# Patient Record
Sex: FEMALE | HISPANIC OR LATINO | Employment: PART TIME | URBAN - METROPOLITAN AREA
[De-identification: names, ages, dates, MRNs, and addresses within clinical notes are randomized per-mention and may not be internally consistent; named-entity substitution may affect disease eponyms.]

---
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
## 2023-10-09 ENCOUNTER — OFFICE VISIT (OUTPATIENT)
Dept: URGENT CARE | Facility: PHYSICIAN GROUP | Age: 18
End: 2023-10-09

## 2023-10-09 ENCOUNTER — HOSPITAL ENCOUNTER (OUTPATIENT)
Dept: RADIOLOGY | Facility: MEDICAL CENTER | Age: 18
End: 2023-10-09
Attending: FAMILY MEDICINE

## 2023-10-09 VITALS
TEMPERATURE: 97.8 F | RESPIRATION RATE: 16 BRPM | HEIGHT: 63 IN | DIASTOLIC BLOOD PRESSURE: 76 MMHG | WEIGHT: 185 LBS | OXYGEN SATURATION: 97 % | HEART RATE: 127 BPM | SYSTOLIC BLOOD PRESSURE: 126 MMHG | BODY MASS INDEX: 32.78 KG/M2

## 2023-10-09 DIAGNOSIS — S99.912A INJURY OF LEFT ANKLE, INITIAL ENCOUNTER: ICD-10-CM

## 2023-10-09 DIAGNOSIS — S93.402A SPRAIN OF LEFT ANKLE, UNSPECIFIED LIGAMENT, INITIAL ENCOUNTER: ICD-10-CM

## 2023-10-09 PROCEDURE — 3078F DIAST BP <80 MM HG: CPT | Performed by: FAMILY MEDICINE

## 2023-10-09 PROCEDURE — 73610 X-RAY EXAM OF ANKLE: CPT | Mod: LT

## 2023-10-09 PROCEDURE — 99213 OFFICE O/P EST LOW 20 MIN: CPT | Performed by: FAMILY MEDICINE

## 2023-10-09 PROCEDURE — 3074F SYST BP LT 130 MM HG: CPT | Performed by: FAMILY MEDICINE

## 2023-10-09 ASSESSMENT — ENCOUNTER SYMPTOMS
FOCAL WEAKNESS: 0
ROS SKIN COMMENTS: NO ABRASION OR LACERATION
SENSORY CHANGE: 0

## 2023-10-09 NOTE — PROGRESS NOTES
"Subjective     Alysia Lewis is a 18 y.o. female who presents with Ankle Injury (Recent fall that led to her putting all her weight on LT ankle x1 day. 10 on pain scale when standing, has mobility, difficulty putting pressure on it. )            1 day left ankle inversion injury due to fall downstairs.  Unable to bear weight.  No prior injury or surgery.  Some relief with ice and nsaid. No other aggravating alleviating factors.        Review of Systems   Skin:         No abrasion or laceration     Neurological:  Negative for sensory change and focal weakness.              Objective     /76 (BP Location: Left arm, Patient Position: Sitting, BP Cuff Size: Large adult)   Pulse (!) 127   Temp 36.6 °C (97.8 °F) (Temporal)   Resp 16   Ht 1.6 m (5' 3\")   Wt 83.9 kg (185 lb) Comment: Pt reported  SpO2 97%   BMI 32.77 kg/m²      Physical Exam  Constitutional:       Appearance: Normal appearance.   Cardiovascular:      Comments: Rate now 90's  Musculoskeletal:      Comments: Left ankle: tender lateral malleolus and interosseous syndesmosis. Slight laxity anterior drawer. Lateral soft tissue swelling. No other point bony tenderness of ankle, foot, or leg. Distal neuro/vascular intact. Skin intact.        Skin:     General: Skin is warm and dry.   Neurological:      Mental Status: She is alert.                             Assessment & Plan       Xray per radiology  1.  No acute displaced left ankle fracture  2.  Lucency in the medial left talus possibly an area of osteochondral injury. This does not affect the overlying cortex of the talar dome.     1. Injury of left ankle, initial encounter  DX-ANKLE 3+ VIEWS LEFT      2. Sprain of left ankle, unspecified ligament, initial encounter            Differential diagnosis, natural history, supportive care, and indications for immediate follow-up were discussed.     Offered referral to sports medicine given findings of high ankle sprain and some laxity on exam.  She " has had previous ankle sprain on the right and has her own walking boot and crutches.  She prefers to rehab her ankle herself.

## 2023-10-09 NOTE — LETTER
October 9, 2023         Patient: Alysia Lewis   YOB: 2005   Date of Visit: 10/9/2023           To Whom it May Concern:    Alysia Lewis was seen in my clinic on 10/9/2023. Please excuse from work 10/11 and 10/13/2023. Please allow walking boot as needed for 1 month.     Sincerely,           Edgard Mijares M.D.  Electronically Signed

## 2023-10-09 NOTE — LETTER
October 9, 2023         Patient: Alysia Lewis   YOB: 2005   Date of Visit: 10/9/2023           To Whom it May Concern:    Alysia Lewis was seen in my clinic on 10/9/2023. Please excuse from classes 10/10 and 10/12/2023.     Sincerely,           Edgard Mijares M.D.  Electronically Signed

## 2025-07-04 ENCOUNTER — OFFICE VISIT (OUTPATIENT)
Dept: URGENT CARE | Facility: PHYSICIAN GROUP | Age: 20
End: 2025-07-04
Payer: COMMERCIAL

## 2025-07-04 VITALS
DIASTOLIC BLOOD PRESSURE: 90 MMHG | WEIGHT: 194.22 LBS | SYSTOLIC BLOOD PRESSURE: 122 MMHG | BODY MASS INDEX: 34.41 KG/M2 | HEART RATE: 88 BPM | TEMPERATURE: 97.2 F | HEIGHT: 63 IN | RESPIRATION RATE: 12 BRPM | OXYGEN SATURATION: 98 %

## 2025-07-04 DIAGNOSIS — R14.0 BLOATING: ICD-10-CM

## 2025-07-04 DIAGNOSIS — R11.0 NAUSEA: Primary | ICD-10-CM

## 2025-07-04 DIAGNOSIS — Z00.00 HEALTHCARE MAINTENANCE: ICD-10-CM

## 2025-07-04 PROCEDURE — 3080F DIAST BP >= 90 MM HG: CPT

## 2025-07-04 PROCEDURE — 99213 OFFICE O/P EST LOW 20 MIN: CPT

## 2025-07-04 PROCEDURE — 3074F SYST BP LT 130 MM HG: CPT

## 2025-07-04 RX ORDER — ONDANSETRON 4 MG/1
4 TABLET, ORALLY DISINTEGRATING ORAL ONCE
Status: COMPLETED | OUTPATIENT
Start: 2025-07-04 | End: 2025-07-04

## 2025-07-04 RX ORDER — ONDANSETRON 4 MG/1
4 TABLET, FILM COATED ORAL EVERY 6 HOURS PRN
Qty: 30 TABLET | Refills: 1 | Status: SHIPPED | OUTPATIENT
Start: 2025-07-04

## 2025-07-04 RX ADMIN — ONDANSETRON 4 MG: 4 TABLET, ORALLY DISINTEGRATING ORAL at 10:27

## 2025-07-04 ASSESSMENT — ENCOUNTER SYMPTOMS: ROS GI COMMENTS: 1

## 2025-07-04 NOTE — PROGRESS NOTES
"Subjective:   Alysia Lewis is a 19 y.o. female who presents for GI Problem (C/o bloating/ fullness, nausea after every meal, hunger pains, x 1 week. )    Patient presents to the clinic for complaints of feelings of bloating and nausea after meals x 1 week.  Patient quickly gets full and bloated after eating a relatively small amount of her meals and develops some nausea after meals.   Has not vomited despite the nausea.   Patient still able to eat and drink, but with less volume over the last week than her baseline.  Did not eat anything out of the ordinary. No one in the household w/ similar symptoms.   Has taken Pepto Bismol and [place], which has not helped.   Patient denies chest pain, SOB, dizziness/lightheadedness/vertigo, vomiting/diarrhea, difficulty breathing or swallowing, palpitations or racing heart rate, fever, chills, abdominal pain, or acid reflux.       GI Problem        Review of Systems   Gastrointestinal:         1     Refer to HPI for additional details.    During this visit, appropriate PPE was worn, and hand hygiene was performed.    PMH:  has no past medical history of Allergy, ASTHMA, Asthma, Diabetes, or Type II or unspecified type diabetes mellitus without mention of complication, not stated as uncontrolled.    MEDS: Current Medications[1]    ALLERGIES: Allergies[2]  SURGHX: Past Surgical History[3]  SOCHX:  reports that she has never smoked. She has never used smokeless tobacco. She reports that she does not drink alcohol and does not use drugs.    FH: Per HPI as applicable/pertinent.    Medications, Allergies, and current problem list reviewed today in Epic.     Objective:     BP (!) 122/90 (BP Location: Left arm, Patient Position: Sitting, BP Cuff Size: Adult long)   Pulse 88   Temp 36.2 °C (97.2 °F) (Temporal)   Resp 12   Ht 1.6 m (5' 3\")   Wt 88.1 kg (194 lb 3.6 oz)   SpO2 98%     Physical Exam  Constitutional:       Appearance: Normal appearance. She is not ill-appearing " or toxic-appearing.   HENT:      Head: Normocephalic.      Right Ear: External ear normal.      Left Ear: External ear normal.      Nose: Nose normal. No congestion or rhinorrhea.      Mouth/Throat:      Mouth: Mucous membranes are moist.   Eyes:      General:         Right eye: No discharge.         Left eye: No discharge.      Extraocular Movements: Extraocular movements intact.      Conjunctiva/sclera: Conjunctivae normal.   Cardiovascular:      Rate and Rhythm: Normal rate and regular rhythm.      Pulses: Normal pulses.      Heart sounds: Normal heart sounds. No murmur heard.  Pulmonary:      Effort: Pulmonary effort is normal. No respiratory distress.      Breath sounds: Normal breath sounds. No wheezing or rhonchi.   Abdominal:      General: Abdomen is flat. Bowel sounds are normal. There is no distension.      Palpations: Abdomen is soft. There is no mass.      Tenderness: There is no abdominal tenderness. There is no guarding or rebound.      Hernia: No hernia is present.   Musculoskeletal:         General: No signs of injury. Normal range of motion.      Cervical back: Normal range of motion. No rigidity.   Lymphadenopathy:      Cervical: No cervical adenopathy.   Skin:     General: Skin is warm and dry.      Capillary Refill: Capillary refill takes less than 2 seconds.      Coloration: Skin is not jaundiced or pale.   Neurological:      General: No focal deficit present.      Mental Status: She is alert and oriented to person, place, and time.      Motor: No weakness.   Psychiatric:         Mood and Affect: Mood normal.         Behavior: Behavior normal.         Thought Content: Thought content normal.         Judgment: Judgment normal.         Assessment/Plan:     Diagnosis and associated orders:     1. Nausea  - Referral to Gastroenterology  - ondansetron (ZOFRAN) 4 MG Tab tablet; Take 1 Tablet by mouth every 6 hours as needed for Nausea/Vomiting.  Dispense: 30 Tablet; Refill: 1  - ondansetron (Zofran  ODT) dispertab 4 mg    2. Bloating  - Referral to Gastroenterology    3. Healthcare maintenance  - Referral to establish with PCP     Comments/MDM:     Discussed that likely etiology of the patient's symptoms is delayed emptying gastric versus other gastric abnormalities.  Referral to gastroenterology placed for the patient.  Referral to establish with PCP also placed for the patient for general healthcare maintenance.  Zofran ODT administered in clinic.  Zofran as needed prescribed to the patient. Discussed proper administration and dosing as well as associated adverse/side effects of prescribed medications.  Advised patient to continue OTC pharmacologic and nonpharmacologic treatment of her symptoms, including but not limited to Tylenol, Motrin, OTC cough and nausea medication, and plenty of rest and fluids.    Patient agreeable to this plan of care.  All questions answered.  Return to clinic if symptoms persist or worsen.  Red flag symptoms warranting emergency medical services discussed, including but not limited to chest pain, SOB, wheezing, difficulty breathing or swallowing, sensation of throat closing or mass in the throat, severe intractable headache, changes to vision or hearing, palpitations or racing heart rate, abdominal pain, fever greater than 103F despite medication management, Inability to tolerate oral intake, blood in the vomitusor stool, dizziness/lightheadedness/vertigo, or nausea/vomiting/diarrhea.           Differential diagnosis, natural history, supportive care, and indications for immediate follow-up discussed.    Advised the patient to follow-up with the primary care physician for recheck, reevaluation, and consideration of further management.    Instructed patient to seek emergency medical attention via calling EMS or going to the Emergency Room for red flag symptoms, including but not limited to: chest pain, palpitations, fever greater than 103F, shortness of breath, wheezing, new or  worsened numbness/tingling, focal or unilateral weakness, and bloody vomit/stool.     Please note that this dictation was created using voice recognition software. I have made a reasonable attempt to correct obvious errors, but I expect that there are errors of grammar and possibly content that I did not discover before finalizing the note.    This note was electronically signed by ELVIN Jacobo           [1]   Current Outpatient Medications:     ondansetron (ZOFRAN) 4 MG Tab tablet, Take 1 Tablet by mouth every 6 hours as needed for Nausea/Vomiting., Disp: 30 Tablet, Rfl: 1    Current Facility-Administered Medications:     ondansetron (Zofran ODT) dispertab 4 mg, 4 mg, Oral, Once,   [2] No Known Allergies  [3] History reviewed. No pertinent surgical history.

## 2025-07-10 NOTE — Clinical Note
REFERRAL APPROVAL NOTICE         Sent on July 10, 2025                   Alysia F Lewis  [address] [facility] [address]                   Dear Ms. Lewis,    After a careful review of the medical information and benefit coverage, [facility] has processed your referral. See below for additional details.    If applicable, you must be actively enrolled with your insurance for coverage of the authorized service. If you have any questions regarding your coverage, please contact your insurance directly.    REFERRAL INFORMATION   Referral #:  [id number]  Referred-To Provider    Referred-By Provider:  Gastroenterology    ELVIN Jacobo   [facility]      [address]  [address]  [facility] [id number] [phone] [address] [facility] [address]  [phone]    Referral Start Date:  07/04/2025  Referral End Date:   07/04/2026             SCHEDULING  If you do not already have an appointment, please call [phone] to make an appointment.     MORE INFORMATION  If you do not already have a [employer] account, sign up at: [email or web address]  You can access your medical information, make appointments, see lab results, billing information, and more.  If you have questions regarding this referral, please contact  the [facility] Referrals department at:             [phone]. Monday - Friday 8:00AM - 5:00PM.     Sincerely,    [facility]